# Patient Record
Sex: MALE | Race: WHITE | Employment: FULL TIME
[De-identification: names, ages, dates, MRNs, and addresses within clinical notes are randomized per-mention and may not be internally consistent; named-entity substitution may affect disease eponyms.]

---

## 2024-10-23 ENCOUNTER — HOSPITAL ENCOUNTER (EMERGENCY)
Facility: HOSPITAL | Age: 24
Discharge: HOME | End: 2024-10-23
Payer: MEDICAID

## 2024-10-23 VITALS
RESPIRATION RATE: 18 BRPM | SYSTOLIC BLOOD PRESSURE: 148 MMHG | OXYGEN SATURATION: 99 % | TEMPERATURE: 97.3 F | DIASTOLIC BLOOD PRESSURE: 97 MMHG | HEART RATE: 103 BPM | WEIGHT: 208 LBS

## 2024-10-23 DIAGNOSIS — S61.012A THUMB LACERATION, LEFT, INITIAL ENCOUNTER: Primary | ICD-10-CM

## 2024-10-23 DIAGNOSIS — S61.012A LACERATION OF LEFT THUMB WITHOUT FOREIGN BODY WITHOUT DAMAGE TO NAIL, INITIAL ENCOUNTER: ICD-10-CM

## 2024-10-23 PROCEDURE — 90471 IMMUNIZATION ADMIN: CPT | Performed by: PHYSICIAN ASSISTANT

## 2024-10-23 PROCEDURE — 99284 EMERGENCY DEPT VISIT MOD MDM: CPT

## 2024-10-23 PROCEDURE — 90715 TDAP VACCINE 7 YRS/> IM: CPT | Performed by: PHYSICIAN ASSISTANT

## 2024-10-23 PROCEDURE — 12001 RPR S/N/AX/GEN/TRNK 2.5CM/<: CPT

## 2024-10-23 PROCEDURE — 6370000000 HC RX 637 (ALT 250 FOR IP): Performed by: PHYSICIAN ASSISTANT

## 2024-10-23 PROCEDURE — 6360000002 HC RX W HCPCS: Performed by: PHYSICIAN ASSISTANT

## 2024-10-23 RX ORDER — IBUPROFEN 600 MG/1
600 TABLET, FILM COATED ORAL
Status: COMPLETED | OUTPATIENT
Start: 2024-10-23 | End: 2024-10-23

## 2024-10-23 RX ADMIN — TETANUS TOXOID, REDUCED DIPHTHERIA TOXOID AND ACELLULAR PERTUSSIS VACCINE, ADSORBED 0.5 ML: 5; 2.5; 8; 8; 2.5 SUSPENSION INTRAMUSCULAR at 10:09

## 2024-10-23 RX ADMIN — IBUPROFEN 600 MG: 600 TABLET, FILM COATED ORAL at 09:58

## 2024-10-23 ASSESSMENT — PAIN - FUNCTIONAL ASSESSMENT: PAIN_FUNCTIONAL_ASSESSMENT: 0-10

## 2024-10-23 ASSESSMENT — PAIN SCALES - GENERAL: PAINLEVEL_OUTOF10: 7

## 2024-10-23 NOTE — ED PROVIDER NOTES
EMERGENCY DEPARTMENT HISTORY & PHYSICAL EXAM    10/23/24, 9:28 AM EDT    Clinical Impression:  1. Thumb laceration, left, initial encounter    2. Laceration of left thumb without foreign body without damage to nail, initial encounter        Assessment/Differential Diagnosis:     Ddx laceration, deep structure injury, foreign body, nail involvement all considered    ED Course:   Initial assessment performed. The patients presenting problems have been discussed, and they are in agreement with the care plan formulated and outlined with them.  I have encouraged them to ask questions as they arise throughout their visit.    Patient comes the ED with family members.  Patient states around 730 this morning he was attempting to cut something when he actually cut his left thumb.  Bleeding was brisk initially, stopped with pressure.  It has been bleeding intermittently since that time so came to the ED for evaluation.  He is right-hand dominant, unsure of his last tetanus.  No concern for foreign body.  Taking no blood thinners.    Exam with healthy appearing young adult male sitting up on stretcher.  Bandage to his left thumb.  He appears comfortable no acute distress.  Vitals reveal elevated blood pressure, pulse 103.  Examination of his left hand, left thumb does reveal a superficial flap-like laceration along the lateral distal thumb.  No nail involvement.  No foreign body or deep structure.  Thumb is neurovascular intact.  Mild bleeding noted.    Motrin ordered    LACERATION NOTE  Tdap given today  Procedure discussed with pt. Verbal consent was obtained. Questions were answered.   Anesthesia- no anesthesia. Thumb tourniquet applied to base of thumb. Pt tolerated well. No complications.  Wound was irrigated and cleansed . Would was explored with no FB or deep structures seen.  Skin glue applied X 2  Tourniquet removed, no bleeding  Wound measures 1.5cm in total length.  Wound care

## 2024-10-23 NOTE — DISCHARGE INSTRUCTIONS
Keep the glue dry for the next 2 days.  After 2 days you can use the thumb as usual, can get it wet.  Do not pick at the glue, can keep covered with a dry bandage if needed  If glue still there after 7 days you can use some Vaseline to loosen it  Tdap was given today, let your doctor know further records  Return to ER if any new or worsening symptoms or new concerns

## 2024-10-23 NOTE — ED TRIAGE NOTES
Patient was cutting a cigar top off using a knife. Patient has bandage applied. Sts bleeding will still in progress. Lac is to left thumb.